# Patient Record
Sex: MALE | Race: WHITE | Employment: FULL TIME | URBAN - METROPOLITAN AREA
[De-identification: names, ages, dates, MRNs, and addresses within clinical notes are randomized per-mention and may not be internally consistent; named-entity substitution may affect disease eponyms.]

---

## 2019-04-05 ENCOUNTER — TELEPHONE (OUTPATIENT)
Dept: OPHTHALMOLOGY | Facility: CLINIC | Age: 59
End: 2019-04-05

## 2019-04-05 NOTE — TELEPHONE ENCOUNTER
Spoke to patient.  Sounds like he is becoming more presbyopic and needs a complete eye exam.  Blurred up close and having trouble seeing off and on x 2 months.  Not any sudden vision loss, pain, flashes or floaters.  Suggested he call optometry first and see what the cost of a complete eye exam is since he does not have any insurance.  He agreed and will try this first before coming to see ophthalmology.

## 2019-04-05 NOTE — TELEPHONE ENCOUNTER
Reason for Call:  Other call back    Detailed comments: Patient called stating that back in 2001 he had CAT surg (can not remember the provider) and had a lens implanted then, Patient states now he is feeing like that lens is moving, he does not have insurance and would like a return call to discuss what or who he should be seeing. Thank you    Phone Number Patient can be reached at: Home number on file [phone] (home)    Best Time: any    Can we leave a detailed message on this number? YES    Call taken on 4/5/2019 at 3:24 PM by CLINT LORD

## 2019-07-17 ENCOUNTER — TELEPHONE (OUTPATIENT)
Dept: OPHTHALMOLOGY | Facility: CLINIC | Age: 59
End: 2019-07-17

## 2019-07-17 NOTE — TELEPHONE ENCOUNTER
Spoke with patient - he complains of intermittent blurry vision in his left eye.  He has found if he tips his chin down, he is able to see clearer out of his left eye.  Vision in his right eye is good.  He currently does not have insurance and is concerned about the cost.  I gave him the Business [employer] phone number regarding potential costs.  He prefers to have an evening appointment secondary to his erratic work schedule.  I suggested he schedule an appointment with one of our Optometrist (whom have evening hours) so they can evaluate his vision.   History of cataract surgery, left eye, with Dr. Trevino about 20 years ago.

## 2019-07-19 ENCOUNTER — OFFICE VISIT (OUTPATIENT)
Dept: OPHTHALMOLOGY | Facility: CLINIC | Age: 59
End: 2019-07-19

## 2019-07-19 ENCOUNTER — OFFICE VISIT (OUTPATIENT)
Dept: OPTOMETRY | Facility: CLINIC | Age: 59
End: 2019-07-19

## 2019-07-19 ENCOUNTER — TELEPHONE (OUTPATIENT)
Dept: OPTOMETRY | Facility: CLINIC | Age: 59
End: 2019-07-19

## 2019-07-19 DIAGNOSIS — T85.22XA DISLOCATION OF LENS IMPLANT, INITIAL ENCOUNTER: Primary | ICD-10-CM

## 2019-07-19 DIAGNOSIS — Z46.0 CONTACT LENS/GLASSES FITTING: Primary | ICD-10-CM

## 2019-07-19 DIAGNOSIS — H25.811 COMBINED FORMS OF AGE-RELATED CATARACT OF RIGHT EYE: ICD-10-CM

## 2019-07-19 PROCEDURE — 92004 COMPRE OPH EXAM NEW PT 1/>: CPT | Performed by: OPHTHALMOLOGY

## 2019-07-19 PROCEDURE — 99207 ZZC NO BILLABLE SERVICE THIS VISIT: CPT | Performed by: OPTOMETRIST

## 2019-07-19 ASSESSMENT — VISUAL ACUITY
OD_PH_SC+: -2
OS_SC: 20/400
METHOD: SNELLEN - LINEAR
OD_SC: 20/25
METHOD: SNELLEN - LINEAR
OD_PH_SC: 20/20
OS_SC: 20/400
OD_SC: 20/25
OD_SC+: -3

## 2019-07-19 ASSESSMENT — CUP TO DISC RATIO
OD_RATIO: 0.0
OS_RATIO: 0.2

## 2019-07-19 ASSESSMENT — REFRACTION_MANIFEST
OS_SPHERE: +10.25
OS_AXIS: 150
OS_CYLINDER: +0.75
OS_ADD: +3.00

## 2019-07-19 ASSESSMENT — SLIT LAMP EXAM - LIDS
COMMENTS: NORMAL
COMMENTS: NORMAL

## 2019-07-19 ASSESSMENT — EXTERNAL EXAM - LEFT EYE: OS_EXAM: NORMAL

## 2019-07-19 ASSESSMENT — TONOMETRY
OD_IOP_MMHG: 12
IOP_METHOD: APPLANATION
OS_IOP_MMHG: 13

## 2019-07-19 ASSESSMENT — REFRACTION_CURRENTRX
OD_BRAND: NO LENS
OS_SPHERE: +12.50
OS_SPHERE: +7.50
OS_BRAND: COOPER BIOFINITY BC 8.6, D 14.0
OD_BRAND: NO LENS

## 2019-07-19 ASSESSMENT — EXTERNAL EXAM - RIGHT EYE: OD_EXAM: NORMAL

## 2019-07-19 NOTE — PATIENT INSTRUCTIONS
Good fit/comfort with Biofinity contact lens left eye.   Order Biofinity XR to trial.  When lens arrives, you will be contacted to schedule a contact lens insertion and removal training appointment.       Walter Gomes O.D.  [address] [place]  [address]    [phone]

## 2019-07-19 NOTE — LETTER
"    7/19/2019         RE: Karl Simmons  [address]  [place] [address]        Dear Colleague,    Thank you for referring your patient, Karl Simmons, to the [facility]. Please see a copy of my visit note below.     Current Eye Medications:  None.      Subjective:  Patient complains of progressively worsening blurred vision in his left eye starting around March - he feels his implant is moving in his eye.  A couple months ago, he was able to clear his vision slightly if he tipped his chin down and looked through the superior portion of his visual field, but that has now disappeared.   He had an eye exam in April, but was not told if there was anything wrong with his left eye.  He currently wears over-the-counter readers.  Vision in his right eye is good, but starting yesterday he noticed a new \"spot\" moving in his vision.   History of cataract surgery when he was in his 20's.  Secondary implant done by Dr. De Los Santos about 20 years ago.    Secondary PCL with HOLLIE 2001 at [facility].     Objective:  See Ophthalmology Exam.       Assessment:  Posteriorly dislocated PCL left eye.      Plan:  Patient will explore insurance options and consider referral to [facility] with AB if able.  In interim will get CL evaluation with SR; understands risks of inflammation and retinal detachment.  Nasir Trevino M.D.  [phone]           Again, thank you for allowing me to participate in the care of your patient.        Sincerely,        Nasir Trevino MD    "

## 2019-07-19 NOTE — TELEPHONE ENCOUNTER
7/19/2019      Order trial for patient and when the lens comes in call patient for I&R appointment.    Biofinity XR 8.6 14.0    Left eye +12.50 sphere      April TapInko    Optometry Tech    7/26/2019    Left message for patient to call us to set up I&R appointment.    AprilStason [employer] Tech    8/1/2019    Patient had I & R 7-    [employer] Tech

## 2019-07-19 NOTE — PROGRESS NOTES
" Current Eye Medications:  None.      Subjective:  Patient complains of progressively worsening blurred vision in his left eye starting around March - he feels his implant is moving in his eye.  A couple months ago, he was able to clear his vision slightly if he tipped his chin down and looked through the superior portion of his visual field, but that has now disappeared.   He had an eye exam in April, but was not told if there was anything wrong with his left eye.  He currently wears over-the-counter readers.  Vision in his right eye is good, but starting yesterday he noticed a new \"spot\" moving in his vision.   History of cataract surgery when he was in his 20's.  Secondary implant done by Dr. De Los Santos about 20 years ago.    Secondary PCL with HOLLIE 2001 at [facility].     Objective:  See Ophthalmology Exam.       Assessment:  Posteriorly dislocated PCL left eye.      Plan:  Patient will explore insurance options and consider referral to [facility] with AB if able.  In interim will get CL evaluation with SR; understands risks of inflammation and retinal detachment.  Nasir Trevino M.D.  [phone]         "

## 2019-07-20 PROBLEM — T85.22XA: Status: ACTIVE | Noted: 2019-07-20

## 2019-07-20 PROBLEM — H25.811 COMBINED FORMS OF AGE-RELATED CATARACT OF RIGHT EYE: Status: ACTIVE | Noted: 2019-07-20

## 2019-07-20 NOTE — PATIENT INSTRUCTIONS
Patient will explore insurance options and consider referral to [facility] with [facility] if able.  In interim will get CL evaluation with SR; understands risks of inflammation and retinal detachment.  Nasir Trevino M.D.  [phone]

## 2019-07-31 ENCOUNTER — OFFICE VISIT (OUTPATIENT)
Dept: OPTOMETRY | Facility: CLINIC | Age: 59
End: 2019-07-31

## 2019-07-31 DIAGNOSIS — Z46.0 CONTACT LENS/GLASSES FITTING: Primary | ICD-10-CM

## 2019-07-31 PROCEDURE — 99207 ZZC NO BILLABLE SERVICE THIS VISIT: CPT | Performed by: OPTOMETRIST

## 2019-07-31 ASSESSMENT — SLIT LAMP EXAM - LIDS
COMMENTS: NORMAL
COMMENTS: NORMAL

## 2019-07-31 ASSESSMENT — EXTERNAL EXAM - RIGHT EYE: OD_EXAM: NORMAL

## 2019-07-31 ASSESSMENT — REFRACTION_CURRENTRX
OS_SPHERE: +12.50
OD_BRAND: NO LENS

## 2019-07-31 ASSESSMENT — VISUAL ACUITY
OS_CC: 20/25
CORRECTION_TYPE: CONTACTS
OD_SC+: -1
METHOD: SNELLEN - LINEAR
OD_SC: 20/20
VA_OR_OS_CURRENT_RX: 20/20
OS_CC+: -2

## 2019-07-31 ASSESSMENT — EXTERNAL EXAM - LEFT EYE: OS_EXAM: NORMAL

## 2019-07-31 NOTE — PATIENT INSTRUCTIONS
Karl MILLS [place]          1960     [id number]     Procedure      Wearing Schedule 1st Week    Wash hands with oil/perfume free soap.   Day 1    4 hours  Cleanse and disinfect contacts daily.    Day 2    6 hours  Clean__optifree_______________________   Day 3    8 hours  Rinse_________________________               Day 4    8 hours  Disinfect______________________    Day 5    10 hours  Rewet________________________    Day 6    10 hours          Day 7    10 hours  Use only eye drops made for contact lenses.  Return in 1-2 weeks for contact check appointment-Come in wearing your contacts.    Replacement Schedule  Replace in  monthly  Sleeping in contacts is NOT recommended.    Sleeping contact lenses increases the risk of contact lens related problems such as but not limited to corneal ulceration,  infiltrates, conjunctivitis, and neovascularization.  Not all contacts are approved for overnight wear.  Make sure you are using your contacts as they are intended.    Congratulations! You have been fitted with contact lenses.  Please follow these simple steps to insure successful contact lens wear.  1.  If your lenses are uncomfortable, cause redness, pain, or blurry vision discontinue wear immediately and call [facility] for an appointment at [phone].  2. Return to Optometry contact lens checks and yearly eye exams.  3. Never wear lenses longer than the prescribed time.  Maximum wearing time of 12  hours a day.  4. Use only prescribed solutions because mixing brands or types may result in problems.  5. Never wear a torn, discolored, scratched, or chipped lens for any reason.  6. Always follow your doctor and 's recommendations.  7. Use only water-soluble cosmetics, especially mascara.  8. Always have a current pair of eyeglasses available.  9. Do not wear contacts while soaking in a hot tub or while swimming.  10. Only FDA approved extended wear contacts are suitable for sleeping.    I  have read and understand all the enclosed material and have been instructed on insertion, removal, and care of my contact lenses.    X_______________________________________________        Return in 2-3 weeks for contact lens follow-up.       Wlater Gomes O.D.  [facility]  [place]  [address]    [phone]

## 2019-07-31 NOTE — LETTER
7/31/2019         RE: Karl Simmons  [address]  [place] [address]        Dear Colleague,    Thank you for referring your patient, Karl Simmons, to the [facility]. Please see a copy of my visit note below.    Chief Complaint   Patient presents with     Contact Lens Insertion and Removal        Chief Complaint   Patient presents with     Contact Lens Insertion and Removal        Replacement : monthly  Solution :Optifree Pure Moist  Skill level :adequate  Class duration: 25 minutes    April Powell Optometric Tech    OBJECTIVE: See Ophthalmology exam     ASSESSMENT:    ICD-10-CM    1. Contact lens/glasses fitting Z46.0            PLAN:  Patient Instructions   Karl Simmons          1960     [id number]     Procedure      Wearing Schedule 1st Week    Wash hands with oil/perfume free soap.   Day 1    4 hours  Cleanse and disinfect contacts daily.    Day 2    6 hours  Clean__optifree_______________________   Day 3    8 hours  Rinse_________________________               Day 4    8 hours  Disinfect______________________    Day 5    10 hours  Rewet________________________    Day 6    10 hours          Day 7    10 hours  Use only eye drops made for contact lenses.  Return in 1-2 weeks for contact check appointment-Come in wearing your contacts.    Replacement Schedule  Replace in  monthly  Sleeping in contacts is NOT recommended.    Sleeping contact lenses increases the risk of contact lens related problems such as but not limited to corneal ulceration,  infiltrates, conjunctivitis, and neovascularization.  Not all contacts are approved for overnight wear.  Make sure you are using your contacts as they are intended.    Congratulations! You have been fitted with contact lenses.  Please follow these simple steps to insure successful contact lens wear.  1.  If your lenses are uncomfortable, cause redness, pain, or blurry vision discontinue wear immediately and call [facility]  for an appointment at [phone].  2. Return to Optometry contact lens checks and yearly eye exams.  3. Never wear lenses longer than the prescribed time.  Maximum wearing time of 12  hours a day.  4. Use only prescribed solutions because mixing brands or types may result in problems.  5. Never wear a torn, discolored, scratched, or chipped lens for any reason.  6. Always follow your doctor and 's recommendations.  7. Use only water-soluble cosmetics, especially mascara.  8. Always have a current pair of eyeglasses available.  9. Do not wear contacts while soaking in a hot tub or while swimming.  10. Only FDA approved extended wear contacts are suitable for sleeping.    I have read and understand all the enclosed material and have been instructed on insertion, removal, and care of my contact lenses.    X_______________________________________________        Return in 2-3 weeks for contact lens follow-up.       Walter Gomes O.D.  [address]  [place]  [address]    [phone]        Again, thank you for allowing me to participate in the care of your patient.        Sincerely,        Walter Gomes OD

## 2019-07-31 NOTE — PROGRESS NOTES
Chief Complaint   Patient presents with     Contact Lens Insertion and Removal        Chief Complaint   Patient presents with     Contact Lens Insertion and Removal        Replacement : monthly  Solution :Optifree Pure Moist  Skill level :adequate  Class duration: 25 minutes    April Powell Optometric Tech    OBJECTIVE: See Ophthalmology exam     ASSESSMENT:    ICD-10-CM    1. Contact lens/glasses fitting Z46.0            PLAN:  Patient Instructions   Karl Simmons          1960     [id number]     Procedure      Wearing Schedule 1st Week    Wash hands with oil/perfume free soap.   Day 1    4 hours  Cleanse and disinfect contacts daily.    Day 2    6 hours  Clean__optifree_______________________   Day 3    8 hours  Rinse_________________________               Day 4    8 hours  Disinfect______________________    Day 5    10 hours  Rewet________________________    Day 6    10 hours          Day 7    10 hours  Use only eye drops made for contact lenses.  Return in 1-2 weeks for contact check appointment-Come in wearing your contacts.    Replacement Schedule  Replace in  monthly  Sleeping in contacts is NOT recommended.    Sleeping contact lenses increases the risk of contact lens related problems such as but not limited to corneal ulceration,  infiltrates, conjunctivitis, and neovascularization.  Not all contacts are approved for overnight wear.  Make sure you are using your contacts as they are intended.    Congratulations! You have been fitted with contact lenses.  Please follow these simple steps to insure successful contact lens wear.  1.  If your lenses are uncomfortable, cause redness, pain, or blurry vision discontinue wear immediately and call [facility] for an appointment at [phone].  2. Return to Optometry contact lens checks and yearly eye exams.  3. Never wear lenses longer than the prescribed time.  Maximum wearing time of 12  hours a day.  4. Use only prescribed solutions because  mixing brands or types may result in problems.  5. Never wear a torn, discolored, scratched, or chipped lens for any reason.  6. Always follow your doctor and 's recommendations.  7. Use only water-soluble cosmetics, especially mascara.  8. Always have a current pair of eyeglasses available.  9. Do not wear contacts while soaking in a hot tub or while swimming.  10. Only FDA approved extended wear contacts are suitable for sleeping.    I have read and understand all the enclosed material and have been instructed on insertion, removal, and care of my contact lenses.    X_______________________________________________        Return in 2-3 weeks for contact lens follow-up.       Walter Gomes O.D.  [address] [place]  [address]    [phone]

## 2019-08-13 ENCOUNTER — OFFICE VISIT (OUTPATIENT)
Dept: OPTOMETRY | Facility: CLINIC | Age: 59
End: 2019-08-13

## 2019-08-13 DIAGNOSIS — Z46.0 CONTACT LENS/GLASSES FITTING: Primary | ICD-10-CM

## 2019-08-13 PROCEDURE — 99207 ZZC NO BILLABLE SERVICE THIS VISIT: CPT | Performed by: OPTOMETRIST

## 2019-08-13 ASSESSMENT — VISUAL ACUITY
METHOD: SNELLEN - LINEAR
OD_SC: 20/25
CORRECTION_TYPE: CONTACTS
VA_OR_OS_CURRENT_RX: 20/20
OS_CC: 20/30
OD_SC+: -1

## 2019-08-13 ASSESSMENT — EXTERNAL EXAM - LEFT EYE: OS_EXAM: NORMAL

## 2019-08-13 ASSESSMENT — REFRACTION_CURRENTRX
OD_BRAND: NO LENS
OS_SPHERE: +12.50

## 2019-08-13 ASSESSMENT — SLIT LAMP EXAM - LIDS
COMMENTS: NORMAL
COMMENTS: NORMAL

## 2019-08-13 ASSESSMENT — EXTERNAL EXAM - RIGHT EYE: OD_EXAM: NORMAL

## 2019-08-13 NOTE — PROGRESS NOTES
Chief Complaint   Patient presents with     Contact Lens Check     Satisfied with contacts:  No-blurred vison    Good comfort:  Yes  Clear vision:     Distance pretty good, trouble up close (has to use reading glasses for more than what he previously did before lens subluxation)    April Powell, Optometric Tech        Medical, surgical and family histories reviewed and updated 8/13/2019.       OBJECTIVE: See Ophthalmology exam    ASSESSMENT:    ICD-10-CM    1. Contact lens/glasses fitting Z46.0       PLAN:    Patient Instructions   Karl was advised of today's exam findings.  Do not sleep in contact lenses. Health risks discussed.  Do not swim in contact lenses . Health risks discussed.  Use new contact solution in case every day.  Replace contacts every month.  Maximum contact wearing time of 12 hours per day.  Contact lenses prescription released to patient today    Walter Gomes O.D.  [address]  [address]    [phone]

## 2019-08-13 NOTE — PATIENT INSTRUCTIONS
Karl was advised of today's exam findings.  Do not sleep in contact lenses. Health risks discussed.  Do not swim in contact lenses . Health risks discussed.  Use new contact solution in case every day.  Replace contacts every month.  Maximum contact wearing time of 12 hours per day.  Contact lenses prescription released to patient today    Walter Gomes O.D.  [address] [place]  [address]    [phone]

## 2019-08-13 NOTE — LETTER
8/13/2019         RE: Karl Simmons  [address]  [place] [address]        Dear Colleague,    Thank you for referring your patient, Karl Simmons, to the [facility]. Please see a copy of my visit note below.    Chief Complaint   Patient presents with     Contact Lens Check     Satisfied with contacts:  No-blurred vison    Good comfort:  Yes  Clear vision:     Distance pretty good, trouble up close (has to use reading glasses for more than what he previously did before lens subluxation)    April Powell, Optometric Tech        Medical, surgical and family histories reviewed and updated 8/13/2019.       OBJECTIVE: See Ophthalmology exam    ASSESSMENT:    ICD-10-CM    1. Contact lens/glasses fitting Z46.0       PLAN:    Patient Instructions   Karl was advised of today's exam findings.  Do not sleep in contact lenses. Health risks discussed.  Do not swim in contact lenses . Health risks discussed.  Use new contact solution in case every day.  Replace contacts every month.  Maximum contact wearing time of 12 hours per day.  Contact lenses prescription released to patient today    Walter Gomes O.D.  [facility]  [address] [place] [address]. [place]  [place]  [address]    [phone]                       Again, thank you for allowing me to participate in the care of your patient.        Sincerely,        Walter Gomes OD

## 2023-09-01 ENCOUNTER — OFFICE VISIT (OUTPATIENT)
Dept: OPTOMETRY | Facility: CLINIC | Age: 63
End: 2023-09-01
Payer: COMMERCIAL

## 2023-09-01 DIAGNOSIS — T85.22XA DISLOCATION OF LENS IMPLANT, INITIAL ENCOUNTER: Primary | ICD-10-CM

## 2023-09-01 PROCEDURE — 99207 PR NO BILLABLE SERVICE THIS VISIT: CPT | Performed by: OPTOMETRIST

## 2023-09-01 NOTE — PROGRESS NOTES
PT WANTED SURGICAL CONSULT FOR REPAIR OF SUBLUXED LENS AT [facility] ON A FRIDAY    Deis Arriaza O.D.  [facility]  [address] [place] [address] [phone]

## 2023-09-01 NOTE — PATIENT INSTRUCTIONS
PT WANTED CONSULT FOR SURGICAL REPAIR OF SUBLUXED IOL AT [facility] ON A FRIDAY    APPT SCHED OCT 27    Desi Arriaza O.D.  [facility]  [address] [place] [address] [phone]

## 2023-09-01 NOTE — LETTER
9/1/2023         RE: Karl Simmons  [address] [place] [address]        Dear Colleague,    Thank you for referring your patient, Karl Simmons, to the [facility]. Please see a copy of my visit note below.    PT WANTED SURGICAL CONSULT [facility]PA[facility]UX[facility]ENS [facility] ON [facility] FRIDAY    Desi Arriaza O.D.  [facility]  [address] [place] [address] [phone]    Again, thank you for allowing me to participate in the care of your patient.        Sincerely,        Desi Arriaza OD

## 2023-10-27 ENCOUNTER — OFFICE VISIT (OUTPATIENT)
Dept: OPHTHALMOLOGY | Facility: CLINIC | Age: 63
End: 2023-10-27
Payer: COMMERCIAL

## 2023-10-27 DIAGNOSIS — H27.132 POSTERIOR DISLOCATION OF LEFT LENS: Primary | ICD-10-CM

## 2023-10-27 PROCEDURE — 99207 PR NO CHARGE LOS: CPT | Performed by: STUDENT IN AN ORGANIZED HEALTH CARE EDUCATION/TRAINING PROGRAM

## 2023-10-27 ASSESSMENT — SLIT LAMP EXAM - LIDS
COMMENTS: NORMAL
COMMENTS: NORMAL

## 2023-10-27 ASSESSMENT — EXTERNAL EXAM - RIGHT EYE: OD_EXAM: NORMAL

## 2023-10-27 ASSESSMENT — TONOMETRY
OD_IOP_MMHG: 13
OS_IOP_MMHG: 13
IOP_METHOD: APPLANATION

## 2023-10-27 ASSESSMENT — VISUAL ACUITY
METHOD: SNELLEN - LINEAR
OS_PH_SC: 20/200
OS_SC: CF
OD_SC+: -2
OD_SC: 20/25

## 2023-10-27 ASSESSMENT — EXTERNAL EXAM - LEFT EYE: OS_EXAM: NORMAL

## 2023-10-27 NOTE — PATIENT INSTRUCTIONS
Referral to Dr. Awilda Lindsay at the [facility] for evaluation/treatment of dislocated posterior intraocular lens left eye    Richa Reyes MD  [phone]

## 2023-10-27 NOTE — LETTER
10/27/2023         RE: Karl Simmons  [address]  [place] [address]      Dear Dr. Luc Arriaza,    Thank you for referring, Karl Simmons, to the [facility]. I referred him to Dr. Lindsay at the [facility] for repair of his dislocated posterior intraocular lens implant of the left eye. Posterior lens dislocations are handled by retina surgeons.     Please see a copy of my visit note below.     Current Eye Medications:  None     Subjective: Here for surgery consult for dislocated lens. Referred by Dr. Desi Arriaza to be seen at [facility]. Patient complains of blurry vision in left eye. Started a couple years ago. Vision is good in the right eye.      Objective:  See Ophthalmology Exam.       Assessment:  Karl Simmons is a 63 year old male who presents with:   Encounter Diagnosis   Name Primary?     Posterior dislocation of left intraocular lens implant Explained that retina referral needed and made that for him.        Plan:  Referral to Dr. Awilda Lindsay at the [facility] for evaluation/treatment of dislocated posterior intraocular lens left eye    Richa Reyes MD  [phone]       Again, thank you for allowing me to participate in the care of your patient.        Sincerely,        Richa Reyes MD

## 2023-10-27 NOTE — PROGRESS NOTES
Current Eye Medications:  None     Subjective: Here for surgery consult for dislocated lens. Referred by Dr. Desi Arriaza to be seen at [facility]. Patient complains of blurry vision in left eye. Started a couple years ago. Vision is good in the right eye.      Objective:  See Ophthalmology Exam.       Assessment:  Karl Simmons is a 63 year old male who presents with:   Encounter Diagnosis   Name Primary?    Posterior dislocation of left intraocular lens implant Explained that retina referral needed and made that for him.        Plan:  Referral to Dr. Awilda Lindsay at the [facility] for evaluation/treatment of dislocated posterior intraocular lens left eye    Richa Reyes MD  [phone]

## 2023-12-15 ENCOUNTER — TRANSFERRED RECORDS (OUTPATIENT)
Dept: HEALTH INFORMATION MANAGEMENT | Facility: CLINIC | Age: 63
End: 2023-12-15

## 2023-12-15 ENCOUNTER — TELEPHONE (OUTPATIENT)
Dept: OPHTHALMOLOGY | Facility: CLINIC | Age: 63
End: 2023-12-15
Payer: COMMERCIAL

## 2023-12-15 NOTE — TELEPHONE ENCOUNTER
[facility] Call Center    Phone Message    May a detailed message be left on voicemail: yes     Reason for Call: Appointment Intake    Referring Provider Name:   Diagnosis and/or Symptoms: Pt is having a cataract procedure done on 12/228/23 and needs to come in and have lens calculations completed. (Eye well calc) Writer first available to schedule is after surgery. Please review and contact pt to discuss scheduling. Thank you      Action Taken: Other: EYE    Travel Screening: Not Applicable

## 2023-12-15 NOTE — TELEPHONE ENCOUNTER
Our last visit notes recommended pt see retina for a dislocated IOL.     Called [facility] - pt has surgery scheduled for 12/28/23 Dr. Pascal - pt needs to have a pre-op exam with his PCP - not with ophthalmology. LVM to call his PCP to schedule a pre-op physical.

## 2023-12-22 ENCOUNTER — TRANSFERRED RECORDS (OUTPATIENT)
Dept: HEALTH INFORMATION MANAGEMENT | Facility: CLINIC | Age: 63
End: 2023-12-22

## 2023-12-22 ENCOUNTER — OFFICE VISIT (OUTPATIENT)
Dept: OPHTHALMOLOGY | Facility: CLINIC | Age: 63
End: 2023-12-22
Payer: COMMERCIAL

## 2023-12-22 DIAGNOSIS — H27.132 POSTERIOR DISLOCATION OF LEFT LENS: Primary | ICD-10-CM

## 2023-12-22 PROCEDURE — 92136 OPHTHALMIC BIOMETRY: CPT | Performed by: STUDENT IN AN ORGANIZED HEALTH CARE EDUCATION/TRAINING PROGRAM

## 2023-12-22 NOTE — LETTER
12/22/2023         RE: Karl Simmons  [address] [place] [address]        Dear Colleague,    Thank you for referring your patient, Karl Simmons, to the [facility]. Please see a copy of my visit note below.    Patient here for IOL measurements at the request of retina surgeon who will be performing lens exchange for dislocated intraocular lens left eye.     Measurements faxed to [facility] 12/22/23.    Richa Reyes MD  [phone]       Again, thank you for allowing me to participate in the care of your patient.        Sincerely,        Richa Reyes MD

## 2023-12-26 ENCOUNTER — TELEPHONE (OUTPATIENT)
Dept: OPHTHALMOLOGY | Facility: CLINIC | Age: 63
End: 2023-12-26
Payer: COMMERCIAL

## 2023-12-26 NOTE — TELEPHONE ENCOUNTER
[facility] Call Center    Phone Message    May a detailed message be left on voicemail: yes     Reason for Call: Other: martina from [facility] is calling in regarding the IOL results from Friday. States they have yet to receive the fax and they need them today for pt's upcoming procedure. Please fax the information to [phone] or it can also be faxed to [phone]     Action Taken: Message routed to:  [facility] eye    Travel Screening: Not Applicable

## 2023-12-26 NOTE — TELEPHONE ENCOUNTER
Faxed IOL measurements to both numbers given. They were also faxed on 12-22-23. Called [facility] and left message that I faxed to both numbers.

## 2024-03-15 ENCOUNTER — TRANSFERRED RECORDS (OUTPATIENT)
Dept: HEALTH INFORMATION MANAGEMENT | Facility: CLINIC | Age: 64
End: 2024-03-15
Payer: COMMERCIAL

## 2025-01-31 ENCOUNTER — TRANSFERRED RECORDS (OUTPATIENT)
Dept: HEALTH INFORMATION MANAGEMENT | Facility: CLINIC | Age: 65
End: 2025-01-31
Payer: COMMERCIAL

## 2025-01-31 LAB — RETINOPATHY: NEGATIVE
